# Patient Record
Sex: MALE | Race: WHITE | URBAN - METROPOLITAN AREA
[De-identification: names, ages, dates, MRNs, and addresses within clinical notes are randomized per-mention and may not be internally consistent; named-entity substitution may affect disease eponyms.]

---

## 2017-01-30 ENCOUNTER — EMERGENCY (EMERGENCY)
Facility: HOSPITAL | Age: 32
LOS: 1 days | Discharge: HOME | End: 2017-01-30
Attending: EMERGENCY MEDICINE | Admitting: EMERGENCY MEDICINE
Payer: COMMERCIAL

## 2017-01-30 VITALS
OXYGEN SATURATION: 97 % | HEART RATE: 86 BPM | SYSTOLIC BLOOD PRESSURE: 131 MMHG | TEMPERATURE: 98 F | RESPIRATION RATE: 16 BRPM | DIASTOLIC BLOOD PRESSURE: 81 MMHG

## 2017-01-30 VITALS
TEMPERATURE: 98 F | RESPIRATION RATE: 18 BRPM | SYSTOLIC BLOOD PRESSURE: 122 MMHG | OXYGEN SATURATION: 98 % | DIASTOLIC BLOOD PRESSURE: 75 MMHG | HEART RATE: 78 BPM

## 2017-01-30 DIAGNOSIS — Z23 ENCOUNTER FOR IMMUNIZATION: ICD-10-CM
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
## 2017-01-30 DIAGNOSIS — S61.412A LACERATION WITHOUT FOREIGN BODY OF LEFT HAND, INITIAL ENCOUNTER: ICD-10-CM

## 2017-01-30 PROCEDURE — 99283 EMERGENCY DEPT VISIT LOW MDM: CPT | Mod: 25

## 2017-01-30 PROCEDURE — 12001 RPR S/N/AX/GEN/TRNK 2.5CM/<: CPT

## 2017-01-30 RX ORDER — CEPHALEXIN 500 MG
1 CAPSULE ORAL
Qty: 21 | Refills: 0 | OUTPATIENT
Start: 2017-01-30 | End: 2017-02-06

## 2017-01-30 RX ORDER — TETANUS TOXOID, REDUCED DIPHTHERIA TOXOID AND ACELLULAR PERTUSSIS VACCINE, ADSORBED 5; 2.5; 8; 8; 2.5 [IU]/.5ML; [IU]/.5ML; UG/.5ML; UG/.5ML; UG/.5ML
0.5 SUSPENSION INTRAMUSCULAR ONCE
Qty: 0 | Refills: 0 | Status: COMPLETED | OUTPATIENT
Start: 2017-01-30 | End: 2017-01-30

## 2017-01-30 RX ORDER — IBUPROFEN 200 MG
1 TABLET ORAL
Qty: 30 | Refills: 0 | OUTPATIENT
Start: 2017-01-30

## 2017-01-30 RX ADMIN — TETANUS TOXOID, REDUCED DIPHTHERIA TOXOID AND ACELLULAR PERTUSSIS VACCINE, ADSORBED 0.5 MILLILITER(S): 5; 2.5; 8; 8; 2.5 SUSPENSION INTRAMUSCULAR at 13:57

## 2017-01-30 NOTE — ED PROCEDURE NOTE - CPROC ED POST PROC CARE GUIDE1
Keep the cast/splint/dressing clean and dry./Verbal/written post procedure instructions were given to patient/caregiver./Instructed patient/caregiver regarding signs and symptoms of infection.

## 2017-01-30 NOTE — ED PROVIDER NOTE - OBJECTIVE STATEMENT
32 y/o male presents to ED c/o laceration to the ruiz aspect of L hand sustained while working with a cooler and sledgehammer and after cooler bent (metal) he grabbed it and it cut his hand.  He denies any broken pieces.  He states he cleaned area and covered it.  He reports mild pain to area, no numbness, no weakness, no active bleeding.

## 2017-01-30 NOTE — ED PROVIDER NOTE - PHYSICAL EXAMINATION
LUE:  2cm flap laceration left hand ruiz aspect, minimal bleeding  sensation intact   strength normal  cap refill nl, from wrist, nl flexion ext of digits  from elbow

## 2017-01-30 NOTE — ED PROVIDER NOTE - MEDICAL DECISION MAKING DETAILS
cc:  hand laceration  ddx:  no concern for fb tendon or nerve involvement  plan:  laceration repair, motrin for pain, tdap.  Return in 10 days for suture removal.

## 2017-01-30 NOTE — ED PROVIDER NOTE - CARE PLAN
Principal Discharge DX:	Laceration of left hand, initial encounter Principal Discharge DX:	Laceration of left hand, initial encounter  Instructions for follow-up, activity and diet:	10 days for suture removal

## 2024-01-23 NOTE — ED PROCEDURE NOTE - PROCEDURE DATE TIME, MLM
Conveyed recommendations from Dr Huang in [facility]    There is no concern with CPAP mask as far as stents go per Dr. Huang.   30-Jan-2017 13:15

## 2024-11-13 NOTE — ED ADULT NURSE NOTE - CCCP TRG CHIEF CMPLNT
Chief Complaint  This information was obtained from the patient  Patient is here for a wound are follow up. He denies any pain to his wounds. No complaints.     Allergies  NKDA    HPI  This information was obtained from the patient    3-1-19: Wounds impro PAIN.     12-7-18: Legs swollen - wounds stable - one wound closed. 11-30-18: WOunds stable and slightly better. 11-21-18: Wound culture + enterobacter - started CIPRO - will DC doxycycline.    He did not tolerate comprilan well - had pain and rash Notes:  negative except HPI    Additional Information  Medication reconciliation completed at today's visit. : Yes        Objective    Constitutional  Height/Length: 73 in (185.42 cm), Weight: 267 lbs (121.36 kgs), BMI: 35.2, Temperature: 96.1 °F (35.61 °C lacerations periwound skin is Warm. Periwound skin does not exhibit signs or symptoms of infection. Local Pulse is Weak. Wound #7 Right, Medial Lower Leg is an acute Full Thickness Venous Ulcer and has received a status of Bridged.  Subsequent wound encounter measur measurements are 4.4cm length x 3.6cm width x 0.1cm depth, with an area of 15.84 sq cm and a volume of 1.584 cubic cm. No tunneling has been noted. No sinus tract has been noted. No undermining has been noted.  There is a small amount of sanguineous drainag serous drainage noted which has no odor. The patient reports a wound pain of level 0/10. The wound margin is flat and intact. Wound bed has % pink, firm granulation. The periwound skin moisture is normal. The periwound skin exhibited: Edema.  The te control was achieved using 4% Lido. A time out was not conducted prior to the start of the procedure. A minimal amount of bleeding was controlled with pressure.  The procedure was tolerated well with a pain level of 0 throughout and a pain level of 0 follow preparation for physician exam if wound bed contains fibrin or eschar. 4% Topical Lidocaine    Wound Cleansing & Dressings  May shower with protection.   Cleanse with saline or wound cleanser  Other skin sub: - THERASKIN SKIN SUBSTITUTE - applied - secure Standing/Walking with protection. Cleanse with saline or wound cleanser  Collagen  Sorbact  Kerramax/Super absorbent  ABD pad  Kerlix  Paper tape  Change dressing every: - 4 days    Additional Orders:    Compression Therapy:  Comprifore  Compression to Right Leg.   Walter